# Patient Record
Sex: FEMALE | Race: BLACK OR AFRICAN AMERICAN | NOT HISPANIC OR LATINO | Employment: FULL TIME | URBAN - METROPOLITAN AREA
[De-identification: names, ages, dates, MRNs, and addresses within clinical notes are randomized per-mention and may not be internally consistent; named-entity substitution may affect disease eponyms.]

---

## 2021-09-18 ENCOUNTER — HOSPITAL ENCOUNTER (EMERGENCY)
Facility: CLINIC | Age: 31
Discharge: HOME | End: 2021-09-18
Attending: EMERGENCY MEDICINE | Admitting: EMERGENCY MEDICINE
Payer: COMMERCIAL

## 2021-09-18 VITALS
RESPIRATION RATE: 16 BRPM | HEART RATE: 84 BPM | TEMPERATURE: 97.8 F | HEIGHT: 63 IN | SYSTOLIC BLOOD PRESSURE: 144 MMHG | WEIGHT: 222 LBS | BODY MASS INDEX: 39.34 KG/M2 | DIASTOLIC BLOOD PRESSURE: 78 MMHG | OXYGEN SATURATION: 98 %

## 2021-09-18 DIAGNOSIS — T63.444A BEE STING REACTION, UNDETERMINED INTENT, INITIAL ENCOUNTER: ICD-10-CM

## 2021-09-18 PROCEDURE — 250N000013 HC RX MED GY IP 250 OP 250 PS 637: Performed by: EMERGENCY MEDICINE

## 2021-09-18 PROCEDURE — 99283 EMERGENCY DEPT VISIT LOW MDM: CPT

## 2021-09-18 RX ORDER — IBUPROFEN 600 MG/1
600 TABLET, FILM COATED ORAL ONCE
Status: COMPLETED | OUTPATIENT
Start: 2021-09-18 | End: 2021-09-18

## 2021-09-18 RX ORDER — ACETAMINOPHEN 325 MG/1
650 TABLET ORAL ONCE
Status: COMPLETED | OUTPATIENT
Start: 2021-09-18 | End: 2021-09-18

## 2021-09-18 RX ORDER — FAMOTIDINE 20 MG/1
20 TABLET, FILM COATED ORAL 2 TIMES DAILY PRN
Qty: 10 TABLET | Refills: 0 | Status: SHIPPED | OUTPATIENT
Start: 2021-09-18 | End: 2021-09-20

## 2021-09-18 RX ORDER — DIPHENHYDRAMINE HCL 50 MG
50 CAPSULE ORAL ONCE
Status: COMPLETED | OUTPATIENT
Start: 2021-09-18 | End: 2021-09-18

## 2021-09-18 RX ORDER — FAMOTIDINE 10 MG
10 TABLET ORAL ONCE
Status: COMPLETED | OUTPATIENT
Start: 2021-09-18 | End: 2021-09-18

## 2021-09-18 RX ORDER — DIPHENHYDRAMINE HCL 25 MG
25 CAPSULE ORAL EVERY 6 HOURS PRN
Qty: 10 CAPSULE | Refills: 0 | Status: SHIPPED | OUTPATIENT
Start: 2021-09-18 | End: 2021-09-20

## 2021-09-18 RX ADMIN — IBUPROFEN 600 MG: 600 TABLET, FILM COATED ORAL at 20:25

## 2021-09-18 RX ADMIN — DIPHENHYDRAMINE HYDROCHLORIDE 50 MG: 50 CAPSULE ORAL at 20:25

## 2021-09-18 RX ADMIN — FAMOTIDINE 10 MG: 10 TABLET, FILM COATED ORAL at 20:37

## 2021-09-18 RX ADMIN — ACETAMINOPHEN 650 MG: 325 TABLET ORAL at 20:25

## 2021-09-18 ASSESSMENT — ENCOUNTER SYMPTOMS
MYALGIAS: 1
COLOR CHANGE: 1

## 2021-09-18 ASSESSMENT — MIFFLIN-ST. JEOR: SCORE: 1701.12

## 2021-09-19 NOTE — DISCHARGE INSTRUCTIONS
Use the Benadryl and famotidine as needed for pain and swelling over the next couple of days.  You can use Tylenol and ibuprofen over-the-counter for any pain.

## 2021-09-19 NOTE — ED TRIAGE NOTES
Patient was stung at 1900 today by a bee and is having pain, some swelling.  No bee allergy, no other symptoms.  No medications taken at home.

## 2021-09-19 NOTE — ED PROVIDER NOTES
EMERGENCY DEPARTMENT ENCOUNTER       ED Course & Medical Decision Making     8:09 PM I met with patient for initial interview and encounter. PPE worn includes surgical mask.  8:17 PM I updated patient with plan for discharge. Patient agreeable.      I did see and examined the patient.  This does appear to be a localized reaction.  No sign of anaphylaxis.  She did have a couple of rings on that finger and we were easily able to remove them.  She will keep these off until her symptoms completely resolved.    She is treated with symptomatic management, Benadryl, Pepcid, Tylenol and ibuprofen and feeling better.  Plan is to discharge her with the same medications and she will follow-up with her regular physician.  She has no other complaints    Prior to making a final disposition on this patient the results of patient's tests and other diagnostic studies were discussed with the patient. All questions were answered. Patient expressed understanding of the plan and was amenable to it.    Medications   acetaminophen (TYLENOL) tablet 650 mg (has no administration in time range)   ibuprofen (ADVIL/MOTRIN) tablet 600 mg (has no administration in time range)   diphenhydrAMINE (BENADRYL) capsule 50 mg (has no administration in time range)   famotidine (PEPCID) tablet 10 mg (has no administration in time range)       Final Impression     1. Bee sting reaction, undetermined intent, initial encounter            Chief Complaint     Chief Complaint   Patient presents with     Insect Bite       Patient was stung at 1900 today by a bee and is having pain, some swelling.  No bee allergy, no other symptoms.  No medications taken at home.      MARY JO Nettles is a 31 year old female who presents for evaluation of bee sting.    Patient reports she was stung by a large bee while playing outside earlier tonight. She was stung on the dorsal side of her left hand and now has pain, redness, and swelling to the area. She does not note if  "the swelling has spread or not. States the stinger did not stay in her hand. She has not taken any medication at home. Patient denies bee allergy.     Patient denies any other complaints.        I, Nicko Renu, am serving as a scribe to document services personally performed by Julio Cesar Quezada M.D. based on my observation and the provider's statements to me. IJulio Cesar M.D attest that Nicko Sears is acting in a scribe capacity, has observed my performance of the services and has documented them in accordance with my direction.    Past Medical History     No past medical history on file.  No past surgical history on file.  No family history on file.   Social History     Tobacco Use     Smoking status: Never Smoker   Substance Use Topics     Alcohol use: Not on file     Drug use: Not on file       Relevant past medical, surgical, family and social history as documented above, has been reviewed and discussed with patient. No changes or additions, unless otherwise noted in the HPI.    Current Medications     diphenhydrAMINE (BENADRYL) 25 MG capsule  famotidine (PEPCID) 20 MG tablet  ibuprofen (ADVIL,MOTRIN) 600 MG tablet  lidocaine (LIDODERM) 5 %  methylPREDNISolone (MEDROL DOSEPACK) 4 mg tablet        Allergies     No Known Allergies    Review of Systems     Review of Systems   Musculoskeletal: Positive for myalgias (right hand).   Skin: Positive for color change (left hand redness and swelling).   All other systems reviewed and are negative.       Remainder of systems reviewed, unless noted in HPI all others negative.    Physical Exam     BP (!) 144/78   Pulse 84   Temp 97.8  F (36.6  C) (Temporal)   Resp 16   Ht 1.6 m (5' 3\")   Wt 100.7 kg (222 lb)   SpO2 98%   BMI 39.33 kg/m      Physical Exam  Constitutional:       General: She is not in acute distress.     Appearance: She is not diaphoretic.   HENT:      Head: Atraumatic.      Mouth/Throat:      Pharynx: Oropharynx is clear. No oropharyngeal " exudate.   Eyes:      General: No scleral icterus.  Cardiovascular:      Rate and Rhythm: Normal rate.   Pulmonary:      Effort: No respiratory distress.      Breath sounds: Normal breath sounds. No wheezing.   Skin:     General: Skin is warm.      Findings: No rash.      Comments: Mild localized area of erythema with subtle swelling to the left hand.  The stinger is no longer present             Labs & Imaging         Labs Ordered and Resulted from Time of ED Arrival Up to the Time of Departure from the ED - No data to display                  Julio Cesar Quezada MD  09/18/21 2027

## 2022-07-10 ENCOUNTER — HOSPITAL ENCOUNTER (EMERGENCY)
Facility: CLINIC | Age: 32
Discharge: HOME | End: 2022-07-10
Attending: EMERGENCY MEDICINE | Admitting: EMERGENCY MEDICINE
Payer: COMMERCIAL

## 2022-07-10 VITALS
BODY MASS INDEX: 39.68 KG/M2 | DIASTOLIC BLOOD PRESSURE: 89 MMHG | HEART RATE: 73 BPM | OXYGEN SATURATION: 100 % | SYSTOLIC BLOOD PRESSURE: 143 MMHG | RESPIRATION RATE: 16 BRPM | WEIGHT: 224 LBS | TEMPERATURE: 98.6 F

## 2022-07-10 DIAGNOSIS — J02.0 ACUTE STREPTOCOCCAL PHARYNGITIS: ICD-10-CM

## 2022-07-10 LAB
DEPRECATED S PYO AG THROAT QL EIA: POSITIVE
FLUAV RNA SPEC QL NAA+PROBE: NEGATIVE
FLUBV RNA RESP QL NAA+PROBE: NEGATIVE
RSV RNA SPEC NAA+PROBE: NEGATIVE
SARS-COV-2 RNA RESP QL NAA+PROBE: NEGATIVE

## 2022-07-10 PROCEDURE — C9803 HOPD COVID-19 SPEC COLLECT: HCPCS

## 2022-07-10 PROCEDURE — 87880 STREP A ASSAY W/OPTIC: CPT | Performed by: EMERGENCY MEDICINE

## 2022-07-10 PROCEDURE — 99283 EMERGENCY DEPT VISIT LOW MDM: CPT

## 2022-07-10 PROCEDURE — 87637 SARSCOV2&INF A&B&RSV AMP PRB: CPT | Performed by: EMERGENCY MEDICINE

## 2022-07-10 RX ORDER — AMOXICILLIN 875 MG
875 TABLET ORAL 2 TIMES DAILY
Qty: 14 TABLET | Refills: 0 | Status: SHIPPED | OUTPATIENT
Start: 2022-07-10 | End: 2022-07-17

## 2022-07-10 ASSESSMENT — ENCOUNTER SYMPTOMS
CHILLS: 0
VOMITING: 0
TROUBLE SWALLOWING: 1
SHORTNESS OF BREATH: 0
SORE THROAT: 1
NAUSEA: 0
FEVER: 0
DIARRHEA: 0
COUGH: 1

## 2022-07-11 NOTE — ED PROVIDER NOTES
Emergency Department Encounter      NAME: Amie Nettles  AGE: 32 year old female  YOB: 1990  MRN: [id number]  EVALUATION DATE & TIME: 7/10/2022 10:09 PM    PCP: System, Provider Not In    ED PROVIDER: Carlos Chacon M.D.      Chief Complaint   Patient presents with     Pharyngitis         FINAL IMPRESSION:  1. Acute streptococcal pharyngitis        MEDICAL DECISION MAKING:    10:20 PM I met with the patient, obtained history, performed an initial exam, and discussed options and plan for diagnostics and treatment here in the ED. PPE worn: surgical mask and nitrile gloves.  11:36 PM Results were communicated with the patient. Discussed discharge plans along with return precautions. Patient was agreeable with plan.      This patient is a 32-year-old female who presents with a sore throat.  She says that she takes care of her kids and family who were sick with similar illness.  She says that she has had 3 days of a sore throat which she describes as a hot itchy sensation.  She also has pain in the right anterior neck.  She says it does get worse with eating.  She has had a cough productive with brown phlegm.  On exam she has some erythema seen in the posterior pharynx.  Her lungs were clear to auscultation.  She denied having fever or chills.  I have ordered a influenza A, influenza B, COVID and strep test.  A chest x-ray is done as well to evaluate for pneumonia.  The patient's influenza test were negative.  Her COVID test is negative.  The rapid strep test that was positive.  I canceled the chest x-ray that I ordered for her as well as ago put her on a course of antibiotic anyway and she was comfortable with this plan.  I will put her on a course of amoxicillin for the strep infection.    Pertinent Labs & Imaging studies reviewed. (See chart for details)    The importance of close follow up was discussed. We reviewed warning signs and symptoms, and I instructed Ms. Nettles to return to the emergency  "department immediately if she develops any new or worsening symptoms. I provided additional verbal discharge instructions. Ms. Nettles expressed understanding and agreement with this plan of care, her questions were answered, and she was discharged in stable condition.       MEDICATIONS GIVEN IN THE EMERGENCY:  Medications - No data to display    NEW PRESCRIPTIONS STARTED AT TODAY'S ER VISIT:  New Prescriptions    AMOXICILLIN (AMOXIL) 875 MG TABLET    Take 1 tablet (875 mg) by mouth 2 times daily for 7 days          =================================================================    HPI    Patient information was obtained from: patient     Use of : N/A        Amie Nettles is a 32 year old female with no relevant medical history, who presents sore throat.    Patient has endorsed sore throat for the past week that radiates to the middle of her throat but worst on the right side. She states it feels \"itchy and hot\". It does hurt to swallow and is not able to eat much secondary to pain. She also has had 3 days of productive cough with brown phlegm and runny nose. Her kids are sick with same symptoms at home. No associated fever, chills, shortness of breath, nausea, vomiting, diarrhea. No other medical complaints at this time.      REVIEW OF SYSTEMS   Review of Systems   Constitutional: Negative for chills and fever.   HENT: Positive for sore throat and trouble swallowing (pain).    Respiratory: Positive for cough (productive with \"brown\" phlegm). Negative for shortness of breath.    Gastrointestinal: Negative for diarrhea, nausea and vomiting.   All other systems reviewed and are negative.       PAST MEDICAL HISTORY:  No past medical history on file.    PAST SURGICAL HISTORY:  No past surgical history on file.    CURRENT MEDICATIONS:    No current facility-administered medications for this encounter.    Current Outpatient Medications:      amoxicillin (AMOXIL) 875 MG tablet, Take 1 tablet (875 mg) by mouth 2 " times daily for 7 days, Disp: 14 tablet, Rfl: 0     ibuprofen (ADVIL,MOTRIN) 600 MG tablet, [IBUPROFEN (ADVIL,MOTRIN) 600 MG TABLET] Take 1 tablet (600 mg total) by mouth every 6 (six) hours as needed for pain., Disp: 60 tablet, Rfl: 0     lidocaine (LIDODERM) 5 %, [LIDOCAINE (LIDODERM) 5 %] Remove & Discard patch within 12 hours or as directed by MD, Disp: 15 patch, Rfl: 0     methylPREDNISolone (MEDROL DOSEPACK) 4 mg tablet, [METHYLPREDNISOLONE (MEDROL DOSEPACK) 4 MG TABLET] Follow package directions, Disp: 21 tablet, Rfl: 0    ALLERGIES:  No Known Allergies    FAMILY HISTORY:  No family history on file.    SOCIAL HISTORY:   Social History     Socioeconomic History     Marital status:      Number of children: 3   Tobacco Use     Smoking status: Never Smoker   Social History Narrative    Kids aged 3, 2, and 1 (as of 10/14/20).        PHYSICAL EXAM:    Vitals: BP (!) 150/89   Pulse 88   Temp 98.6  F (37  C)   Resp 16   Wt 101.6 kg (224 lb)   SpO2 97%   BMI 39.68 kg/m     Constitutional: Well developed, well nourished. Comfortable appearing.  HEAD:Normocephalic, atraumatic,   Eyes: PERRLA, EOM intact, conjunctiva clear, no discharge  ENT: bilateral erythema to posterior pharynx, no tonsillar enlargement, mucous membranes moist, nose normal.   Neck- Mild tenderness over right node. Supple, gross ROM intact.  No JVD.   Pulmonary: Clear to auscultation bilaterally, no respiratory distress, no wheezing, speaks full sentences easily.  Chest: No chest wall tenderness  Cardiovascular: Normal heart rate, regular rhythm, no murmurs. No lower extremity edema, 2+ DP pulses.   GI: Soft, no tenderness to deep palpation in all quadrants, not distended, no masses.  No hepatosplenomegaly.  Musculoskeletal: Moving all 4 extremities intentionally and without pain. No obvious deformity.  Back: No CVA tenderness  Skin: Warm, dry, no rash.  Neurologic: Alert & oriented x 3, speech clear, moving all extremities spontaneously    Psychiatric: Affect normal, cooperative.     LAB:  All pertinent labs reviewed and interpreted.  Labs Ordered and Resulted from Time of ED Arrival to Time of ED Departure   STREPTOCOCCUS A RAPID SCREEN W REFELX TO PCR - Abnormal       Result Value    Group A Strep antigen Positive (*)    INFLUENZA A/B & SARS-COV2 PCR MULTIPLEX - Normal    Influenza A PCR Negative      Influenza B PCR Negative      RSV PCR Negative      SARS CoV2 PCR Negative         RADIOLOGY:  No orders to display       PROCEDURES:   None      I, Riya Browng, am serving as a scribe to document services personally performed by Dr. Carlos Chacon based on my observation and the provider's statements to me. I, Carlos Chacon M.D. attest that Riya Dotson is acting in a scribe capacity, has observed my performance of the services and has documented them in accordance with my direction.      Carlos Chacon M.D.  Emergency Medicine  [facility] EMERGENCY ROOM  [facility] access hospital[address] [facility] [id number] [phone]       Carlos Chacon MD  07/10/22 [id number]

## 2022-07-12 ENCOUNTER — NURSE TRIAGE (OUTPATIENT)
Dept: NURSING | Facility: CLINIC | Age: 32
End: 2022-07-12

## 2022-07-12 ENCOUNTER — TELEPHONE (OUTPATIENT)
Dept: NURSING | Facility: CLINIC | Age: 32
End: 2022-07-12

## 2022-07-12 NOTE — TELEPHONE ENCOUNTER
Pt calling about Rx for amoxicillin. States that pharmacy won't fill for her. Requests that [facility]NA call Rx in.    Pharmacy: [address]    Per previous telephone encounter, FNA has called in Rx.    [facility] called [facility] and per staff, should be ready in 30 minutes for .    Pt notified.    Jamee Salazar RN/[place] Nurse Advisor      Additional Information    Caller has medication question only, adult not sick, and triager answers question    Protocols used: MEDICATION QUESTION CALL-A-[place]

## 2022-07-12 NOTE — TELEPHONE ENCOUNTER
is calling stating the the [facility] will not fill the amoxicillin.    It is not clear what the issue is.    Called the pharmacy and the Rx is not there and the pharmacy is not Open.    Called the RX into the pharmacy and did let the patient know the rx is ready for pick as the pharmacy is a 24 hour pharmacy.    Jessica Montana RN  [place] Nurse Advisor  8:53 AM  7/12/2022

## 2023-03-23 ENCOUNTER — APPOINTMENT (OUTPATIENT)
Dept: ULTRASOUND IMAGING | Facility: CLINIC | Age: 33
End: 2023-03-23
Attending: EMERGENCY MEDICINE
Payer: COMMERCIAL

## 2023-03-23 ENCOUNTER — APPOINTMENT (OUTPATIENT)
Dept: MRI IMAGING | Facility: CLINIC | Age: 33
End: 2023-03-23
Attending: EMERGENCY MEDICINE
Payer: COMMERCIAL

## 2023-03-23 ENCOUNTER — HOSPITAL ENCOUNTER (EMERGENCY)
Facility: CLINIC | Age: 33
Discharge: HOME | End: 2023-03-23
Attending: EMERGENCY MEDICINE | Admitting: EMERGENCY MEDICINE
Payer: COMMERCIAL

## 2023-03-23 VITALS
OXYGEN SATURATION: 100 % | DIASTOLIC BLOOD PRESSURE: 77 MMHG | HEIGHT: 63 IN | RESPIRATION RATE: 18 BRPM | TEMPERATURE: 98.4 F | SYSTOLIC BLOOD PRESSURE: 144 MMHG | BODY MASS INDEX: 39.69 KG/M2 | HEART RATE: 72 BPM | WEIGHT: 224 LBS

## 2023-03-23 DIAGNOSIS — Z33.1 PREGNANCY, INCIDENTAL: ICD-10-CM

## 2023-03-23 DIAGNOSIS — R10.31 ABDOMINAL PAIN, RIGHT LOWER QUADRANT: ICD-10-CM

## 2023-03-23 PROBLEM — Z60.3 LANGUAGE BARRIER: Status: ACTIVE | Noted: 2017-12-07

## 2023-03-23 PROBLEM — Z67.30 TYPE AB BLOOD, RH POSITIVE: Status: ACTIVE | Noted: 2023-03-23

## 2023-03-23 PROBLEM — Z75.8 LANGUAGE BARRIER: Status: ACTIVE | Noted: 2017-12-07

## 2023-03-23 PROBLEM — M65.4 DE QUERVAIN'S TENOSYNOVITIS, LEFT: Status: ACTIVE | Noted: 2018-01-25

## 2023-03-23 PROBLEM — Z23 NEED FOR TDAP VACCINATION: Status: ACTIVE | Noted: 2017-12-07

## 2023-03-23 LAB
ALBUMIN SERPL-MCNC: 3.4 G/DL (ref 3.5–5)
ALBUMIN UR-MCNC: 10 MG/DL
ALP SERPL-CCNC: 61 U/L (ref 45–120)
ALT SERPL W P-5'-P-CCNC: <9 U/L (ref 0–45)
ANION GAP SERPL CALCULATED.3IONS-SCNC: 8 MMOL/L (ref 5–18)
APPEARANCE UR: CLEAR
AST SERPL W P-5'-P-CCNC: 11 U/L (ref 0–40)
BASOPHILS # BLD AUTO: 0 10E3/UL (ref 0–0.2)
BASOPHILS NFR BLD AUTO: 1 %
BILIRUB DIRECT SERPL-MCNC: <0.1 MG/DL
BILIRUB SERPL-MCNC: 0.2 MG/DL (ref 0–1)
BILIRUB UR QL STRIP: NEGATIVE
BUN SERPL-MCNC: 7 MG/DL (ref 8–22)
CALCIUM SERPL-MCNC: 8.9 MG/DL (ref 8.5–10.5)
CHLORIDE BLD-SCNC: 106 MMOL/L (ref 98–107)
CO2 SERPL-SCNC: 22 MMOL/L (ref 22–31)
COLOR UR AUTO: ABNORMAL
CREAT SERPL-MCNC: 0.56 MG/DL (ref 0.6–1.1)
EOSINOPHIL # BLD AUTO: 0.1 10E3/UL (ref 0–0.7)
EOSINOPHIL NFR BLD AUTO: 1 %
ERYTHROCYTE [DISTWIDTH] IN BLOOD BY AUTOMATED COUNT: 12.4 % (ref 10–15)
GFR SERPL CREATININE-BSD FRML MDRD: >90 ML/MIN/1.73M2
GLUCOSE BLD-MCNC: 74 MG/DL (ref 70–125)
GLUCOSE UR STRIP-MCNC: NEGATIVE MG/DL
HCG SERPL-ACNC: ABNORMAL MLU/ML (ref 0–4)
HCT VFR BLD AUTO: 37.3 % (ref 35–47)
HGB BLD-MCNC: 12.5 G/DL (ref 11.7–15.7)
HGB UR QL STRIP: NEGATIVE
IMM GRANULOCYTES # BLD: 0 10E3/UL
IMM GRANULOCYTES NFR BLD: 0 %
KETONES UR STRIP-MCNC: NEGATIVE MG/DL
LEUKOCYTE ESTERASE UR QL STRIP: ABNORMAL
LIPASE SERPL-CCNC: 50 U/L (ref 0–52)
LYMPHOCYTES # BLD AUTO: 2.5 10E3/UL (ref 0.8–5.3)
LYMPHOCYTES NFR BLD AUTO: 41 %
MCH RBC QN AUTO: 27.3 PG (ref 26.5–33)
MCHC RBC AUTO-ENTMCNC: 33.5 G/DL (ref 31.5–36.5)
MCV RBC AUTO: 81 FL (ref 78–100)
MONOCYTES # BLD AUTO: 0.4 10E3/UL (ref 0–1.3)
MONOCYTES NFR BLD AUTO: 7 %
MUCOUS THREADS #/AREA URNS LPF: PRESENT /LPF
NEUTROPHILS # BLD AUTO: 3 10E3/UL (ref 1.6–8.3)
NEUTROPHILS NFR BLD AUTO: 50 %
NITRATE UR QL: NEGATIVE
NRBC # BLD AUTO: 0 10E3/UL
NRBC BLD AUTO-RTO: 0 /100
PH UR STRIP: 6 [PH] (ref 5–7)
PLATELET # BLD AUTO: 212 10E3/UL (ref 150–450)
POTASSIUM BLD-SCNC: 3.8 MMOL/L (ref 3.5–5)
PROT SERPL-MCNC: 7.5 G/DL (ref 6–8)
RBC # BLD AUTO: 4.58 10E6/UL (ref 3.8–5.2)
RBC URINE: 3 /HPF
SODIUM SERPL-SCNC: 136 MMOL/L (ref 136–145)
SP GR UR STRIP: 1.02 (ref 1–1.03)
SQUAMOUS EPITHELIAL: 6 /HPF
TSH SERPL DL<=0.005 MIU/L-ACNC: 1.67 UIU/ML (ref 0.3–5)
UROBILINOGEN UR STRIP-MCNC: <2 MG/DL
WBC # BLD AUTO: 6 10E3/UL (ref 4–11)
WBC URINE: 12 /HPF

## 2023-03-23 PROCEDURE — 99285 EMERGENCY DEPT VISIT HI MDM: CPT | Mod: 25

## 2023-03-23 PROCEDURE — 250N000013 HC RX MED GY IP 250 OP 250 PS 637: Performed by: EMERGENCY MEDICINE

## 2023-03-23 PROCEDURE — 96360 HYDRATION IV INFUSION INIT: CPT

## 2023-03-23 PROCEDURE — 84702 CHORIONIC GONADOTROPIN TEST: CPT | Performed by: EMERGENCY MEDICINE

## 2023-03-23 PROCEDURE — 81001 URINALYSIS AUTO W/SCOPE: CPT | Performed by: EMERGENCY MEDICINE

## 2023-03-23 PROCEDURE — 84443 ASSAY THYROID STIM HORMONE: CPT | Performed by: EMERGENCY MEDICINE

## 2023-03-23 PROCEDURE — 82248 BILIRUBIN DIRECT: CPT | Performed by: EMERGENCY MEDICINE

## 2023-03-23 PROCEDURE — 76805 OB US >/= 14 WKS SNGL FETUS: CPT

## 2023-03-23 PROCEDURE — 87086 URINE CULTURE/COLONY COUNT: CPT | Performed by: EMERGENCY MEDICINE

## 2023-03-23 PROCEDURE — 83690 ASSAY OF LIPASE: CPT | Performed by: EMERGENCY MEDICINE

## 2023-03-23 PROCEDURE — 258N000003 HC RX IP 258 OP 636: Performed by: EMERGENCY MEDICINE

## 2023-03-23 PROCEDURE — 36415 COLL VENOUS BLD VENIPUNCTURE: CPT | Performed by: EMERGENCY MEDICINE

## 2023-03-23 PROCEDURE — 85025 COMPLETE CBC W/AUTO DIFF WBC: CPT | Performed by: EMERGENCY MEDICINE

## 2023-03-23 PROCEDURE — 74181 MRI ABDOMEN W/O CONTRAST: CPT

## 2023-03-23 RX ORDER — ACETAMINOPHEN 325 MG/1
975 TABLET ORAL ONCE
Status: COMPLETED | OUTPATIENT
Start: 2023-03-23 | End: 2023-03-23

## 2023-03-23 RX ADMIN — SODIUM CHLORIDE 1000 ML: 9 INJECTION, SOLUTION INTRAVENOUS at 21:53

## 2023-03-23 RX ADMIN — ACETAMINOPHEN 975 MG: 325 TABLET ORAL at 21:53

## 2023-03-23 ASSESSMENT — ACTIVITIES OF DAILY LIVING (ADL)
ADLS_ACUITY_SCORE: 35
ADLS_ACUITY_SCORE: 33

## 2023-03-23 ASSESSMENT — ENCOUNTER SYMPTOMS
FEVER: 0
VOMITING: 1
CARDIOVASCULAR NEGATIVE: 1
ABDOMINAL PAIN: 1
NAUSEA: 1
RESPIRATORY NEGATIVE: 1
CHILLS: 0

## 2023-03-24 NOTE — DISCHARGE INSTRUCTIONS
Your work-up in the emergency department demonstrated a approximately 14-week pregnancy.  No other concerning findings were found.  Recommend rest fluids follow-up with your primary care doctor and obstetrician later this week.  If you have escalation your symptoms or develop distal concern return to emergency department repeat assessment.

## 2023-03-24 NOTE — ED TRIAGE NOTES
Pt presents to the ED with c/o lower abdominal pain that has been going on for 5. Pt reports that she recently found out she is pregnant, no BEENA. Pt reports that the pain radiates to lower back. Hx of c-cestion. Pt endorses N/V, 1 episode of emesis. Normal BM's and urination.      Triage Assessment       Row Name 03/23/23 2028       Triage Assessment (Adult)    Airway WDL WDL       Respiratory WDL    Respiratory WDL WDL       Skin Circulation/Temperature WDL    Skin Circulation/Temperature WDL WDL       Cardiac WDL    Cardiac WDL WDL       Peripheral/Neurovascular WDL    Peripheral Neurovascular WDL WDL       Cognitive/Neuro/Behavioral WDL    Cognitive/Neuro/Behavioral WDL WDL                  
No

## 2023-03-24 NOTE — ED PROVIDER NOTES
EMERGENCY DEPARTMENT ENCOUNTER      NAME: Amie Nettles  AGE: 32 year old female  YOB: 1990  MRN: [id number]  EVALUATION DATE & TIME: 3/23/2023  9:03 PM    PCP: System, Provider Not In    ED PROVIDER: Prateek Hoyos MD    Chief Complaint   Patient presents with     Abdominal Pain     FINAL IMPRESSION:  1. Abdominal pain, right lower quadrant    2. Pregnancy, incidental      ED COURSE & MEDICAL DECISION MAKING:    Pertinent Labs & Imaging studies reviewed. (See chart for details)  32 year old female presents to the Emergency Department for evaluation of lower abdominal pain.  Patient found out this week she was pregnant.  She had a implanted birth control.  This was removed her per her report.  [facility].  Describes a low cramping pain across the lower abdomen and a band underneath the umbilicus down low where her previous  scar is.  Some nausea with 1 episode of vomiting and occasional loose stools.  Patient reports she is otherwise healthy.  The pregnancy was a surprise she has no idea how pregnant she is.  She has had 5 days of pain.  It steady not increasing or decreasing.  No fever no chills.  No body aches.  Denies additional symptoms.  She is a G4, P3.  Differential broad at this point.  Probable first trimester pregnancy.  Considerations for normal pregnancy, appendicitis, UTI, intestinal illness, ectopic pregnancy.  Recommended the patient that we initiate care with laboratory testing and ultrasound.  We will administer some fluids and Tylenol for management of her symptoms.    10:32 PM  Persistent right lower quadrant pain of uncertain etiology at this time.  Concern for appendicitis.  Ultrasound demonstrating intrauterine pregnancy approximately 14-week gestation.  Laboratory testing overall reassuring.  Urinalysis negative.  I had a discussion with the patient regarding her options at this point.  Given no fever normal  white count and lower pretest probability for appendicitis I did not feel that it was unreasonable to monitor patient's symptoms for the next 24 to 48 hours for improvement.  Alternatively, persistent right lower quadrant pain of uncertain etiology on examination I do not think is unreasonable to perform MRI to rule out appendicitis.  Engaging in a shared decision-making process the patient is opting for MRI imaging.  That is pending at this time.    11:49 PM  The MRI did not demonstrate any acute appendicitis.  No clear source for patient's pain identified.  Probable pain secondary to her progressing pregnancy.  No concerning findings evidence of infection or other pathology identified.  I think overall patient appropriate for discharge home with close monitoring and close follow-up on outpatient basis.  Patient comfortable this plan of care.  Reviewed return precautions prior to discharge.       At the conclusion of the encounter I discussed the results of all of the tests and the disposition. The questions were answered. The patient or family acknowledged understanding and was agreeable with the care plan.       Medical Decision Making    History:    Supplemental history from: Documented in chart, if applicable    External Record(s) reviewed: Documented in chart, if applicable. and Outpatient Record: Reviewed [facility] urgent care from [facility]    Work Up:    Chart documentation includes differential considered and any EKGs or imaging independently interpreted by provider, where specified.    In additional to work up documented, I considered the following work up: Documented in chart, if applicable.    External consultation:    Discussion of management with another provider: Documented in chart, if applicable    Complicating factors:    Care impacted by chronic illness: N/A    Care affected by social determinants of health: N/A    Disposition considerations: Discharge. No recommendations on prescription strength  medication(s). See documentation for any additional details.        MEDICATIONS GIVEN IN THE EMERGENCY:  Medications   0.9% sodium chloride BOLUS (0 mLs Intravenous Stopped 3/23/23 4138)   acetaminophen (TYLENOL) tablet 975 mg (975 mg Oral $Given 3/23/23 2153)       NEW PRESCRIPTIONS STARTED AT TODAY'S ER VISIT  New Prescriptions    No medications on file          =================================================================    HPI    Patient information was obtained from: Patient      Amie Nettles is a 32 year old female with no pertinent past medical history presents to the emergency department for a 5-day history of abdominal pain.  Patient describing a low cramping pain across the lower abdomen in a band traveling right to left side along the old  scar.  Patient reports that she found out earlier this week she was pregnant.  She had an  birth control implant that was subsequently removed.  She has no idea how far along she is in terms of her pregnancy.  Along with the 5 days of pain she has had some nausea with 1 episode of vomiting.  Occasional loose stool.  No fevers no chills no general malaise no urinary symptoms.  Patient's had 3 previous pregnancies denies issue with those pregnancies.  Denies additional symptoms.    REVIEW OF SYSTEMS   Review of Systems   Constitutional: Negative for chills and fever.   Respiratory: Negative.    Cardiovascular: Negative.    Gastrointestinal: Positive for abdominal pain, nausea and vomiting.   Genitourinary: Negative.  Negative for vaginal bleeding and vaginal discharge.   All other systems reviewed and are negative.     PAST MEDICAL HISTORY:  Past Medical History:   Diagnosis Date     Type AB blood, Rh positive 3/23/2023       PAST SURGICAL HISTORY:  No past surgical history on file.        CURRENT MEDICATIONS:    ibuprofen (ADVIL,MOTRIN) 600 MG tablet  lidocaine (LIDODERM) 5 %  methylPREDNISolone (MEDROL DOSEPACK) 4 mg tablet        ALLERGIES:  No  "Known Allergies    FAMILY HISTORY:  No family history on file.    SOCIAL HISTORY:   Social History     Socioeconomic History     Marital status:      Number of children: 3   Tobacco Use     Smoking status: Never   Social History Narrative    Kids aged 3, 2, and 1 (as of 10/14/20).        VITALS:  BP (!) 146/70   Pulse 79   Temp 98.4  F (36.9  C) (Temporal)   Resp 18   Ht 1.6 m (5' 3\")   Wt 101.6 kg (224 lb)   SpO2 100%   BMI 39.68 kg/m      PHYSICAL EXAM    PHYSICAL EXAM    Constitutional: Well developed, Well nourished, NAD  HENT: Normocephalic, Atraumatic, Bilateral external ears normal, Oropharynx normal, mucous membranes moist, Nose normal. Neck-  Normal range of motion, No tenderness, Supple, No stridor.   Eyes: PERRL, EOMI, Conjunctiva normal, No discharge.   Respiratory: Normal breath sounds, No respiratory distress, No wheezing, Speaks full sentences easily. No cough.   Cardiovascular: Normal heart rate, Regular rhythm, No murmurs Chest wall nontender.    GI: No clearly reproducible tenderness to palpation although patient reporting that its infraumbilical right lower quadrant left lower quadrant in terms of her direction at the location of her discomfort.  : No cva tenderness    Musculoskeletal: 2+ DP pulses. No edema. No cyanosis. Good range of motion in all major joints. No tenderness to palpation. No tenderness of the CTLS spine.   Integument: Warm, Dry, No erythema, No rash. No petechiae.   Neurologic: Alert & oriented x 3, Normal motor function, Normal sensory function, No focal deficits noted.   Psychiatric: Affect normal, Judgment normal, Mood normal. Cooperative.      LAB:  All pertinent labs reviewed and interpreted.  Results for orders placed or performed during the hospital encounter of 03/23/23   US OB > 14 Weeks    Impression    IMPRESSION:    1.  Single living intrauterine gestation.  2.  Based on this ultrasound, composite age of 14 weeks 2 days with EDC 09/19/2023.  3.  " Clinical EDC uncertain.  4.  No abnormality to account for pelvic pain.   MR Abdomen w/o Contrast    Impression    IMPRESSION:  1. No convincing cause of acute pain identified in the abdomen or pelvis. Note that the appendix is not visualized and, therefore, early appendicitis cannot be entirely excluded.   HCG quantitative pregnancy (blood)   Result Value Ref Range    hCG Quantitative 56,189 (H) 0 - 4 mlU/mL   Basic metabolic panel   Result Value Ref Range    Sodium 136 136 - 145 mmol/L    Potassium 3.8 3.5 - 5.0 mmol/L    Chloride 106 98 - 107 mmol/L    Carbon Dioxide (CO2) 22 22 - 31 mmol/L    Anion Gap 8 5 - 18 mmol/L    Urea Nitrogen 7 (L) 8 - 22 mg/dL    Creatinine 0.56 (L) 0.60 - 1.10 mg/dL    Calcium 8.9 8.5 - 10.5 mg/dL    Glucose 74 70 - 125 mg/dL    GFR Estimate >90 >60 mL/min/1.73m2   Hepatic function panel   Result Value Ref Range    Bilirubin Total 0.2 0.0 - 1.0 mg/dL    Bilirubin Direct <0.1 <=0.5 mg/dL    Protein Total 7.5 6.0 - 8.0 g/dL    Albumin 3.4 (L) 3.5 - 5.0 g/dL    Alkaline Phosphatase 61 45 - 120 U/L    AST 11 0 - 40 U/L    ALT <9 0 - 45 U/L   Result Value Ref Range    Lipase 50 0 - 52 U/L   UA with Microscopic reflex to Culture    Specimen: Urine, Clean Catch   Result Value Ref Range    Color Urine Light Yellow Colorless, Straw, Light Yellow, Yellow    Appearance Urine Clear Clear    Glucose Urine Negative Negative mg/dL    Bilirubin Urine Negative Negative    Ketones Urine Negative Negative mg/dL    Specific Gravity Urine 1.025 1.001 - 1.030    Blood Urine Negative Negative    pH Urine 6.0 5.0 - 7.0    Protein Albumin Urine 10 (A) Negative mg/dL    Urobilinogen Urine <2.0 <2.0 mg/dL    Nitrite Urine Negative Negative    Leukocyte Esterase Urine 250 Clarice/uL (A) Negative    Mucus Urine Present (A) None Seen /LPF    RBC Urine 3 (H) <=2 /HPF    WBC Urine 12 (H) <=5 /HPF    Squamous Epithelials Urine 6 (H) <=1 /HPF   TSH with free T4 reflex   Result Value Ref Range    TSH 1.67 0.30 - 5.00  uIU/mL   CBC with platelets and differential   Result Value Ref Range    WBC Count 6.0 4.0 - 11.0 10e3/uL    RBC Count 4.58 3.80 - 5.20 10e6/uL    Hemoglobin 12.5 11.7 - 15.7 g/dL    Hematocrit 37.3 35.0 - 47.0 %    MCV 81 78 - 100 fL    MCH 27.3 26.5 - 33.0 pg    MCHC 33.5 31.5 - 36.5 g/dL    RDW 12.4 10.0 - 15.0 %    Platelet Count 212 150 - 450 10e3/uL    % Neutrophils 50 %    % Lymphocytes 41 %    % Monocytes 7 %    % Eosinophils 1 %    % Basophils 1 %    % Immature Granulocytes 0 %    NRBCs per 100 WBC 0 <1 /100    Absolute Neutrophils 3.0 1.6 - 8.3 10e3/uL    Absolute Lymphocytes 2.5 0.8 - 5.3 10e3/uL    Absolute Monocytes 0.4 0.0 - 1.3 10e3/uL    Absolute Eosinophils 0.1 0.0 - 0.7 10e3/uL    Absolute Basophils 0.0 0.0 - 0.2 10e3/uL    Absolute Immature Granulocytes 0.0 <=0.4 10e3/uL    Absolute NRBCs 0.0 10e3/uL       RADIOLOGY:  Reviewed all pertinent imaging. Please see official radiology report.  MR Abdomen w/o Contrast   Preliminary Result   IMPRESSION:   1. No convincing cause of acute pain identified in the abdomen or pelvis. Note that the appendix is not visualized and, therefore, early appendicitis cannot be entirely excluded.      US OB > 14 Weeks   Final Result   IMPRESSION:     1.  Single living intrauterine gestation.   2.  Based on this ultrasound, composite age of 14 weeks 2 days with EDC 09/19/2023.   3.  Clinical EDC uncertain.   4.  No abnormality to account for pelvic pain.        Prateek Hoyos MD  [facility] EMERGENCY ROOM  [address] [address] [phone]     Prateek Hoyos MD  03/23/23 4434

## 2023-03-25 LAB — BACTERIA UR CULT: NO GROWTH

## 2025-04-18 ENCOUNTER — HOSPITAL ENCOUNTER (EMERGENCY)
Facility: CLINIC | Age: 35
Discharge: HOME | End: 2025-04-18
Admitting: PHYSICIAN ASSISTANT

## 2025-04-18 VITALS
WEIGHT: 220 LBS | DIASTOLIC BLOOD PRESSURE: 73 MMHG | SYSTOLIC BLOOD PRESSURE: 123 MMHG | OXYGEN SATURATION: 100 % | HEIGHT: 67 IN | HEART RATE: 80 BPM | BODY MASS INDEX: 34.53 KG/M2 | TEMPERATURE: 97.1 F | RESPIRATION RATE: 18 BRPM

## 2025-04-18 DIAGNOSIS — Z32.01 PREGNANCY TEST POSITIVE: ICD-10-CM

## 2025-04-18 DIAGNOSIS — R63.0 DECREASED APPETITE: ICD-10-CM

## 2025-04-18 DIAGNOSIS — R43.2 ABNORMAL TASTE IN MOUTH: ICD-10-CM

## 2025-04-18 LAB
ALBUMIN UR-MCNC: 30 MG/DL
ANION GAP SERPL CALCULATED.3IONS-SCNC: 13 MMOL/L (ref 7–15)
APPEARANCE UR: ABNORMAL
BILIRUB UR QL STRIP: NEGATIVE
BUN SERPL-MCNC: 6.6 MG/DL (ref 6–20)
CALCIUM SERPL-MCNC: 8.9 MG/DL (ref 8.8–10.4)
CHLORIDE SERPL-SCNC: 105 MMOL/L (ref 98–107)
COLOR UR AUTO: YELLOW
CREAT SERPL-MCNC: 0.42 MG/DL (ref 0.51–0.95)
EGFRCR SERPLBLD CKD-EPI 2021: >90 ML/MIN/1.73M2
ERYTHROCYTE [DISTWIDTH] IN BLOOD BY AUTOMATED COUNT: 13.4 % (ref 10–15)
GLUCOSE SERPL-MCNC: 113 MG/DL (ref 70–99)
GLUCOSE UR STRIP-MCNC: NEGATIVE MG/DL
HCG INTACT+B SERPL-ACNC: ABNORMAL MIU/ML
HCG UR QL: POSITIVE
HCO3 SERPL-SCNC: 19 MMOL/L (ref 22–29)
HCT VFR BLD AUTO: 33.7 % (ref 35–47)
HGB BLD-MCNC: 11.6 G/DL (ref 11.7–15.7)
HGB UR QL STRIP: ABNORMAL
KETONES UR STRIP-MCNC: NEGATIVE MG/DL
LEUKOCYTE ESTERASE UR QL STRIP: ABNORMAL
MCH RBC QN AUTO: 27.5 PG (ref 26.5–33)
MCHC RBC AUTO-ENTMCNC: 34.4 G/DL (ref 31.5–36.5)
MCV RBC AUTO: 80 FL (ref 78–100)
MUCOUS THREADS #/AREA URNS LPF: PRESENT /LPF
NITRATE UR QL: NEGATIVE
PH UR STRIP: 6.5 [PH] (ref 5–7)
PLATELET # BLD AUTO: 217 10E3/UL (ref 150–450)
POTASSIUM SERPL-SCNC: 3.7 MMOL/L (ref 3.4–5.3)
RBC # BLD AUTO: 4.22 10E6/UL (ref 3.8–5.2)
RBC URINE: 9 /HPF
SODIUM SERPL-SCNC: 137 MMOL/L (ref 135–145)
SP GR UR STRIP: 1.03 (ref 1–1.03)
SQUAMOUS EPITHELIAL: 33 /HPF
TSH SERPL DL<=0.005 MIU/L-ACNC: 1.11 UIU/ML (ref 0.3–4.2)
UROBILINOGEN UR STRIP-MCNC: NORMAL MG/DL
WBC # BLD AUTO: 7.7 10E3/UL (ref 4–11)
WBC URINE: 85 /HPF

## 2025-04-18 PROCEDURE — 84443 ASSAY THYROID STIM HORMONE: CPT | Performed by: PHYSICIAN ASSISTANT

## 2025-04-18 PROCEDURE — 87086 URINE CULTURE/COLONY COUNT: CPT | Performed by: PHYSICIAN ASSISTANT

## 2025-04-18 PROCEDURE — 85048 AUTOMATED LEUKOCYTE COUNT: CPT | Performed by: PHYSICIAN ASSISTANT

## 2025-04-18 PROCEDURE — 84702 CHORIONIC GONADOTROPIN TEST: CPT | Performed by: PHYSICIAN ASSISTANT

## 2025-04-18 PROCEDURE — 81001 URINALYSIS AUTO W/SCOPE: CPT | Performed by: PHYSICIAN ASSISTANT

## 2025-04-18 PROCEDURE — 80048 BASIC METABOLIC PNL TOTAL CA: CPT | Performed by: PHYSICIAN ASSISTANT

## 2025-04-18 PROCEDURE — 81003 URINALYSIS AUTO W/O SCOPE: CPT | Performed by: PHYSICIAN ASSISTANT

## 2025-04-18 PROCEDURE — 81025 URINE PREGNANCY TEST: CPT | Performed by: PHYSICIAN ASSISTANT

## 2025-04-18 PROCEDURE — 80051 ELECTROLYTE PANEL: CPT | Performed by: PHYSICIAN ASSISTANT

## 2025-04-18 PROCEDURE — 36415 COLL VENOUS BLD VENIPUNCTURE: CPT | Performed by: PHYSICIAN ASSISTANT

## 2025-04-18 PROCEDURE — 99283 EMERGENCY DEPT VISIT LOW MDM: CPT | Performed by: PHYSICIAN ASSISTANT

## 2025-04-18 PROCEDURE — 82310 ASSAY OF CALCIUM: CPT | Performed by: PHYSICIAN ASSISTANT

## 2025-04-18 PROCEDURE — 85014 HEMATOCRIT: CPT | Performed by: PHYSICIAN ASSISTANT

## 2025-04-18 RX ORDER — CEPHALEXIN 500 MG/1
500 CAPSULE ORAL 2 TIMES DAILY
Qty: 14 CAPSULE | Refills: 0 | Status: SHIPPED | OUTPATIENT
Start: 2025-04-18 | End: 2025-04-25

## 2025-04-18 ASSESSMENT — COLUMBIA-SUICIDE SEVERITY RATING SCALE - C-SSRS
6. HAVE YOU EVER DONE ANYTHING, STARTED TO DO ANYTHING, OR PREPARED TO DO ANYTHING TO END YOUR LIFE?: NO
1. IN THE PAST MONTH, HAVE YOU WISHED YOU WERE DEAD OR WISHED YOU COULD GO TO SLEEP AND NOT WAKE UP?: NO
2. HAVE YOU ACTUALLY HAD ANY THOUGHTS OF KILLING YOURSELF IN THE PAST MONTH?: NO

## 2025-04-18 ASSESSMENT — ACTIVITIES OF DAILY LIVING (ADL): ADLS_ACUITY_SCORE: 41

## 2025-04-18 NOTE — ED TRIAGE NOTES
Pt presents to the ED with c/o of loss of appetite for the past month. Pt reports nothing tastes good. No other symptoms at this time.      Triage Assessment (Adult)       Row Name 04/18/25 1415          Triage Assessment    Airway WDL WDL        Respiratory WDL    Respiratory WDL WDL        Skin Circulation/Temperature WDL    Skin Circulation/Temperature WDL WDL        Cardiac WDL    Cardiac WDL WDL        Peripheral/Neurovascular WDL    Peripheral Neurovascular WDL WDL        Cognitive/Neuro/Behavioral WDL    Cognitive/Neuro/Behavioral WDL WDL

## 2025-04-18 NOTE — ED PROVIDER NOTES
EMERGENCY DEPARTMENT ENCOUNTER      NAME: Amie Nettles  AGE: 34 year old female  YOB: 1990  MRN: [id number]  EVALUATION DATE & TIME: No admission date for patient encounter.    PCP: No Ref-Primary, Physician    ED PROVIDER: Jacinta Omalley PA-C      Chief Complaint   Patient presents with    Anorexia         FINAL IMPRESSION:  1. Abnormal taste in mouth    2. Decreased appetite    3. Pregnancy test positive          ED COURSE & MEDICAL DECISION MAKING:    3:10 PM I introduced myself to patient, performed initial HPI and examination.   4:24 PM Rechecked patient, reports she took a pregnancy test 3 weeks ago that was negative. Suspect this is very early pregnancy.     34 year old female with PMH anguage barrier, De Quervian's tenosynovitis, miscarriage  presents to the Emergency Department for evaluation of abnormal taste, decreased appetite. Reports symptoms x 1 month. No other associated symptoms. Does report history of miscarriage 8 months ago, has not had a menstrual cycle since then. Negative pregnancy test 3 weeks ago.    VSS, afebrile.  Exam reassuring, unremarkable.    Labs: No leukocytosis, mild anemia. No electrolyte derangement. TSH WNL. Pregnancy test is POSITIVE. No vaginal bleeding or pain. I did consider ultrasound, however with 1 month of symptoms that would suggest early pregnancy, as well as negative pregnancy test 3 weeks ago I suspect patient is well within first trimester/early pregnancy, and no symptoms at this time to suggest miscarriage or ectopic pregnancy. Thus imaging deferred. I did discuss findings with patient and referral for OBGYN was placed.  Additionally urine is concerning for infection, but with many epithelial cells. Considering positive pregnancy test, will treat as UTI pending culture.    Discussed results and plan. Instructed on at home supportive management and follow up. All questions answered to the best of my ability. Patient discharged in stable condition.        Medical Decision Making  I reviewed the EMR: Outpatient Record: OBGYN visit 9/2024  Discharge. I prescribed additional prescription strength medication(s) as charted. See documentation for any additional details.    MIPS (CTPE, Dental pain, Faria, Sinusitis, Asthma/COPD, Head Trauma): Not Applicable    SEPSIS: None            MEDICATIONS GIVEN IN THE EMERGENCY:  Medications - No data to display    NEW PRESCRIPTIONS STARTED AT TODAY'S ER VISIT  Discharge Medication List as of 4/18/2025  5:02 PM        START taking these medications    Details   cephALEXin (KEFLEX) 500 MG capsule Take 1 capsule (500 mg) by mouth 2 times daily for 7 days., Disp-14 capsule, R-0, Local Print                =================================================================    HPI    Patient information was obtained from: Patient    Use of : N/A         Amie Nettles is a 34 year old female with a pertinent history of language barrier, De Quervian's tenosynovitis, miscarriage who presents to this ED by private car for evaluation of anorexia/decreased appetite.    1 month ago, noticed things started to taste different.  Intermittently has a sore mouth.    Miscarriage 8 months ago. Had been on oral contraceptives but is not currently. Does report taking a pregnancy test 3 weeks ago.     Has not seen anyone for it yet.         REVIEW OF SYSTEMS   ROS negative unless otherwise stated in HPI    PAST MEDICAL HISTORY:  Past Medical History:   Diagnosis Date    Type AB blood, Rh positive 3/23/2023       PAST SURGICAL HISTORY:  No past surgical history on file.    CURRENT MEDICATIONS:    cephALEXin (KEFLEX) 500 MG capsule  ibuprofen (ADVIL,MOTRIN) 600 MG tablet  lidocaine (LIDODERM) 5 %  methylPREDNISolone (MEDROL DOSEPACK) 4 mg tablet        ALLERGIES:  No Known Allergies    FAMILY HISTORY:  No family history on file.    SOCIAL HISTORY:   Social History     Socioeconomic History    Marital status:     Number of children: 3  "  Tobacco Use    Smoking status: Never   Social History Narrative    Kids aged 3, 2, and 1 (as of 10/14/20).      Social Drivers of Health     Food Insecurity: No Food Insecurity (9/8/2023)    [facility]    Hunger Vital Sign     Worried About Running Out of Food in the Last Year: Never true     Ran Out of Food in the Last Year: Never true       VITALS:  /73   Pulse 80   Temp 97.1  F (36.2  C) (Temporal)   Resp 18   Ht 1.702 m (5' 7\")   Wt 99.8 kg (220 lb)   LMP  (LMP Unknown)   SpO2 100%   BMI 34.46 kg/m      PHYSICAL EXAM    Constitutional: Well developed, Well nourished, NAD, GCS 15   HENT: Normocephalic, Atraumatic, Oropharynx clear.   Neck- Supple, Nontender. Normal ROM.   Eyes: Conjunctiva normal.   Respiratory: No respiratory distress, speaking in full sentences. Normal breath sounds  Cardiovascular: Normal heart rate, Regular rhythm, No murmurs.   GI: Soft, non-tender, Non-distended  Musculoskeletal: No deformities, Moves all extremities equally.   Integument: Warm, Dry, No erythema, ecchymosis, or rash.  Neurologic: Alert & oriented x 3, Normal sensory function. No focal deficits.   Psychiatric: Affect normal, Judgment normal, Mood normal. Cooperative.      LAB:  All pertinent labs reviewed and interpreted.  Results for orders placed or performed during the hospital encounter of 04/18/25   CBC (+ platelets, no diff)   Result Value Ref Range    WBC Count 7.7 4.0 - 11.0 10e3/uL    RBC Count 4.22 3.80 - 5.20 10e6/uL    Hemoglobin 11.6 (L) 11.7 - 15.7 g/dL    Hematocrit 33.7 (L) 35.0 - 47.0 %    MCV 80 78 - 100 fL    MCH 27.5 26.5 - 33.0 pg    MCHC 34.4 31.5 - 36.5 g/dL    RDW 13.4 10.0 - 15.0 %    Platelet Count 217 150 - 450 10e3/uL   Basic metabolic panel   Result Value Ref Range    Sodium 137 135 - 145 mmol/L    Potassium 3.7 3.4 - 5.3 mmol/L    Chloride 105 98 - 107 mmol/L    Carbon Dioxide (CO2) 19 (L) 22 - 29 mmol/L    Anion Gap 13 7 - 15 mmol/L    Urea Nitrogen 6.6 6.0 - " 20.0 mg/dL    Creatinine 0.42 (L) 0.51 - 0.95 mg/dL    GFR Estimate >90 >60 mL/min/1.73m2    Calcium 8.9 8.8 - 10.4 mg/dL    Glucose 113 (H) 70 - 99 mg/dL   UA with Microscopic reflex to Culture    Specimen: Urine, Midstream   Result Value Ref Range    Color Urine Yellow Colorless, Straw, Light Yellow, Yellow    Appearance Urine Turbid (A) Clear    Glucose Urine Negative Negative mg/dL    Bilirubin Urine Negative Negative    Ketones Urine Negative Negative mg/dL    Specific Gravity Urine 1.031 (H) 1.001 - 1.030    Blood Urine 0.03 mg/dL (A) Negative    pH Urine 6.5 5.0 - 7.0    Protein Albumin Urine 30 (A) Negative mg/dL    Urobilinogen Urine Normal Normal mg/dL    Nitrite Urine Negative Negative    Leukocyte Esterase Urine 500 Clarice/uL (A) Negative    Mucus Urine Present (A) None Seen /LPF    RBC Urine 9 (H) <=2 /HPF    WBC Urine 85 (H) <=5 /HPF    Squamous Epithelials Urine 33 (H) <=1 /HPF   HCG qualitative urine   Result Value Ref Range    hCG Urine Qualitative Positive (A) Negative   TSH with free T4 reflex   Result Value Ref Range    TSH 1.11 0.30 - 4.20 uIU/mL       RADIOLOGY:  Reviewed all pertinent imaging. Please see official radiology report.  No orders to display       EKG:    None    PROCEDURES:   None          Jacinta Omalley PA-C  Emergency Medicine  [facility] EMERGENCY ROOM  [facility] [facility] [id number] [phone]             Jacinta Omalley PA-C  04/18/25 5087

## 2025-04-18 NOTE — DISCHARGE INSTRUCTIONS
Your pregnancy test is POSITIVE which I think would explain your symptoms.  I think this is early, considering your symptoms and having a negative pregnancy test a few weeks ago.    Start taking a prenatal vitamin.  Your urine is concerning for infection. Start the antibiotics as prescribed: Keflex twice daily for 7 days.      Avoid alcohol and smoking. No Ibuprofen or NSAID medicines.    You can use tylenol as needed for any pain    I have placed a referral for follow up with OBGYN for prenatal care. They should be calling you to schedule follow up.    Return to the emergency department if you develop any abdominal pain, vaginal bleeding, vomiting/not keeping fluids down, or any other concerning symptoms. We would be happy to see you.

## 2025-04-20 ENCOUNTER — TELEPHONE (OUTPATIENT)
Dept: NURSING | Facility: CLINIC | Age: 35
End: 2025-04-20
Payer: COMMERCIAL

## 2025-04-20 LAB — BACTERIA UR CULT: NORMAL

## 2025-04-20 NOTE — TELEPHONE ENCOUNTER
[facility]     Reason for call: Lab Result Notification     Lab Result (including Rx patient on, if applicable).  If culture, copy of lab report at bottom.  Lab Result: Urine Culture  4/18/25 Prescribed CephALEXin (KEFLEX) 500 MG capsule Take 1 capsule (500 mg) by mouth 2 times daily for 7 days. - Oral     Creatinine Level (mg/dl)   Creatinine   Date Value Ref Range Status   04/18/2025 0.42 (L) 0.51 - 0.95 mg/dL Final    Creatinine clearance (ml/min), if applicable    Serum creatinine: 0.42 mg/dL (L) 04/18/25 1548  Estimated creatinine clearance: 233.4 mL/min (A)     RN Recommendations/Instructions per [facility] ED lab result protocol:   [facility] ED lab result protocol utilized: Urine Culture    4/18/25 Presented to ED with symptoms: abnormal taste, decreased appetite, positive pregnancy test    Patient's current Symptoms at time of telephone encounter:   Pt states a little abdominal pain, pt will follow up with her OB/GYN to see if she should continue antibiotic.      Patient/care giver notified to contact your PCP clinic or return to the Emergency department if your:  Symptoms return.  Symptoms do not improve after 3 days on antibiotic.  Symptoms do not resolve after completing antibiotic.  Symptoms worsen or other concerning symptoms.       Barby Chandra RN